# Patient Record
Sex: MALE | Race: WHITE | NOT HISPANIC OR LATINO | URBAN - METROPOLITAN AREA
[De-identification: names, ages, dates, MRNs, and addresses within clinical notes are randomized per-mention and may not be internally consistent; named-entity substitution may affect disease eponyms.]

---

## 2022-11-15 ENCOUNTER — OFFICE (OUTPATIENT)
Dept: URBAN - METROPOLITAN AREA CLINIC 13 | Facility: CLINIC | Age: 82
End: 2022-11-15
Payer: MEDICARE

## 2022-11-15 VITALS
WEIGHT: 196 LBS | DIASTOLIC BLOOD PRESSURE: 80 MMHG | HEIGHT: 67 IN | HEART RATE: 57 BPM | OXYGEN SATURATION: 97 % | SYSTOLIC BLOOD PRESSURE: 120 MMHG

## 2022-11-15 DIAGNOSIS — K57.30 DIVERTICULOSIS OF LARGE INTESTINE WITHOUT PERFORATION OR ABS: ICD-10-CM

## 2022-11-15 DIAGNOSIS — N18.9 CHRONIC KIDNEY DISEASE, UNSPECIFIED: ICD-10-CM

## 2022-11-15 DIAGNOSIS — R93.2 ABNORMAL FINDINGS ON DIAGNOSTIC IMAGING OF LIVER AND BILIARY: ICD-10-CM

## 2022-11-15 DIAGNOSIS — I48.91 UNSPECIFIED ATRIAL FIBRILLATION: ICD-10-CM

## 2022-11-15 DIAGNOSIS — E11.9 TYPE 2 DIABETES MELLITUS WITHOUT COMPLICATIONS: ICD-10-CM

## 2022-11-15 DIAGNOSIS — K74.60 UNSPECIFIED CIRRHOSIS OF LIVER: ICD-10-CM

## 2022-11-15 DIAGNOSIS — D64.9 ANEMIA, UNSPECIFIED: ICD-10-CM

## 2022-11-15 DIAGNOSIS — E66.3 OVERWEIGHT: ICD-10-CM

## 2022-11-15 PROCEDURE — 99214 OFFICE O/P EST MOD 30 MIN: CPT | Performed by: INTERNAL MEDICINE

## 2023-02-15 ENCOUNTER — OFFICE (OUTPATIENT)
Dept: URBAN - METROPOLITAN AREA CLINIC 13 | Facility: CLINIC | Age: 83
End: 2023-02-15

## 2023-02-15 VITALS
OXYGEN SATURATION: 98 % | HEIGHT: 67 IN | HEART RATE: 54 BPM | SYSTOLIC BLOOD PRESSURE: 142 MMHG | WEIGHT: 195 LBS | DIASTOLIC BLOOD PRESSURE: 70 MMHG

## 2023-02-15 DIAGNOSIS — I48.91 UNSPECIFIED ATRIAL FIBRILLATION: ICD-10-CM

## 2023-02-15 DIAGNOSIS — K57.30 DIVERTICULOSIS OF LARGE INTESTINE WITHOUT PERFORATION OR ABS: ICD-10-CM

## 2023-02-15 DIAGNOSIS — N18.9 CHRONIC KIDNEY DISEASE, UNSPECIFIED: ICD-10-CM

## 2023-02-15 DIAGNOSIS — D50.9 IRON DEFICIENCY ANEMIA, UNSPECIFIED: ICD-10-CM

## 2023-02-15 DIAGNOSIS — K74.60 UNSPECIFIED CIRRHOSIS OF LIVER: ICD-10-CM

## 2023-02-15 DIAGNOSIS — E11.9 TYPE 2 DIABETES MELLITUS WITHOUT COMPLICATIONS: ICD-10-CM

## 2023-02-15 DIAGNOSIS — Z86.010 PERSONAL HISTORY OF COLONIC POLYPS: ICD-10-CM

## 2023-02-15 DIAGNOSIS — D64.9 ANEMIA, UNSPECIFIED: ICD-10-CM

## 2023-02-15 DIAGNOSIS — E66.3 OVERWEIGHT: ICD-10-CM

## 2023-02-15 PROCEDURE — 99215 OFFICE O/P EST HI 40 MIN: CPT | Performed by: INTERNAL MEDICINE

## 2023-04-10 LAB
AFP - TUMOR MARKER: 1.7 NG/ML
CBC, PLATELET CT  AND  DIFF: ABS BASOPHIL: 0 K/UL
CBC, PLATELET CT  AND  DIFF: ABS EOSINOPHIL: 0.3 K/UL
CBC, PLATELET CT  AND  DIFF: ABS IMMATURE GRANS: 0 K/UL
CBC, PLATELET CT  AND  DIFF: ABS LYMPHOCYTE: 1.1 K/UL
CBC, PLATELET CT  AND  DIFF: ABS MONOCYTE: 0.6 K/UL
CBC, PLATELET CT  AND  DIFF: ABS NEUTROPHIL: 3.4 K/UL
CBC, PLATELET CT  AND  DIFF: BASOPHIL: 0.6 %
CBC, PLATELET CT  AND  DIFF: DIFFERENTIAL: (no result)
CBC, PLATELET CT  AND  DIFF: EOSINOPHIL: 5.3 % — HIGH
CBC, PLATELET CT  AND  DIFF: HEMATOCRIT: 35.6 % — LOW
CBC, PLATELET CT  AND  DIFF: HEMOGLOBIN: 12.6 G/DL — LOW
CBC, PLATELET CT  AND  DIFF: IMMATURE GRANULOCYTES: 0.4 %
CBC, PLATELET CT  AND  DIFF: LYMPHOCYTE: 20.4 %
CBC, PLATELET CT  AND  DIFF: MCH: 31.7 PG
CBC, PLATELET CT  AND  DIFF: MCHC: 35.4 G/DL
CBC, PLATELET CT  AND  DIFF: MCV: 89.7 FL
CBC, PLATELET CT  AND  DIFF: MONOCYTE: 10.5 %
CBC, PLATELET CT  AND  DIFF: MPV: 12.3 FL — HIGH
CBC, PLATELET CT  AND  DIFF: NEUTROPHIL: 62.8 %
CBC, PLATELET CT  AND  DIFF: NRBCS: 0 /100 WBC
CBC, PLATELET CT  AND  DIFF: PLATELET COUNT: 141 K/UL
CBC, PLATELET CT  AND  DIFF: RBC: 3.97 M/UL — LOW
CBC, PLATELET CT  AND  DIFF: RDW: 16.1 % — HIGH
CBC, PLATELET CT  AND  DIFF: WBC COUNT: 5.5 K/UL
HEPATIC FUNCTION PANEL: A/G RATIO: 1.5 RATIO
HEPATIC FUNCTION PANEL: ALBUMIN: 4.4 G/DL
HEPATIC FUNCTION PANEL: ALK PHOSPHATASE: 127 U/L
HEPATIC FUNCTION PANEL: ALT: 43 U/L
HEPATIC FUNCTION PANEL: AST: 25 U/L
HEPATIC FUNCTION PANEL: BILIRUBIN, INDIRECT: 0.6 MG/DL
HEPATIC FUNCTION PANEL: BILIRUBIN,DIRECT: 0.2 MG/DL
HEPATIC FUNCTION PANEL: BILIRUBIN,TOTAL: 0.8 MG/DL
HEPATIC FUNCTION PANEL: GLOBULIN: 3 G/DL
HEPATIC FUNCTION PANEL: TOTAL PROTEIN: 7.4 G/DL

## 2023-04-19 ENCOUNTER — ON CAMPUS - OUTPATIENT (OUTPATIENT)
Dept: URBAN - METROPOLITAN AREA HOSPITAL 139 | Facility: HOSPITAL | Age: 83
End: 2023-04-19

## 2023-04-19 DIAGNOSIS — D50.9 IRON DEFICIENCY ANEMIA, UNSPECIFIED: ICD-10-CM

## 2023-04-19 DIAGNOSIS — K57.30 DIVERTICULOSIS OF LARGE INTESTINE WITHOUT PERFORATION OR ABS: ICD-10-CM

## 2023-04-19 DIAGNOSIS — K64.8 OTHER HEMORRHOIDS: ICD-10-CM

## 2023-04-19 DIAGNOSIS — K31.89 OTHER DISEASES OF STOMACH AND DUODENUM: ICD-10-CM

## 2023-04-19 DIAGNOSIS — D12.0 BENIGN NEOPLASM OF CECUM: ICD-10-CM

## 2023-04-19 PROCEDURE — 43239 EGD BIOPSY SINGLE/MULTIPLE: CPT | Performed by: INTERNAL MEDICINE

## 2023-04-19 PROCEDURE — 45380 COLONOSCOPY AND BIOPSY: CPT | Performed by: INTERNAL MEDICINE

## 2023-07-13 ENCOUNTER — OFFICE (OUTPATIENT)
Dept: URBAN - METROPOLITAN AREA CLINIC 13 | Facility: CLINIC | Age: 83
End: 2023-07-13

## 2023-07-13 VITALS
SYSTOLIC BLOOD PRESSURE: 160 MMHG | OXYGEN SATURATION: 97 % | HEART RATE: 64 BPM | WEIGHT: 206 LBS | HEIGHT: 67 IN | DIASTOLIC BLOOD PRESSURE: 78 MMHG

## 2023-07-13 DIAGNOSIS — N18.9 CHRONIC KIDNEY DISEASE, UNSPECIFIED: ICD-10-CM

## 2023-07-13 DIAGNOSIS — E11.9 TYPE 2 DIABETES MELLITUS WITHOUT COMPLICATIONS: ICD-10-CM

## 2023-07-13 DIAGNOSIS — I48.91 UNSPECIFIED ATRIAL FIBRILLATION: ICD-10-CM

## 2023-07-13 DIAGNOSIS — K74.60 UNSPECIFIED CIRRHOSIS OF LIVER: ICD-10-CM

## 2023-07-13 DIAGNOSIS — D12.0 BENIGN NEOPLASM OF CECUM: ICD-10-CM

## 2023-07-13 DIAGNOSIS — E66.9 OBESITY, UNSPECIFIED: ICD-10-CM

## 2023-07-13 DIAGNOSIS — D64.9 ANEMIA, UNSPECIFIED: ICD-10-CM

## 2023-07-13 DIAGNOSIS — K76.0 FATTY (CHANGE OF) LIVER, NOT ELSEWHERE CLASSIFIED: ICD-10-CM

## 2023-07-13 DIAGNOSIS — K57.30 DIVERTICULOSIS OF LARGE INTESTINE WITHOUT PERFORATION OR ABS: ICD-10-CM

## 2023-07-13 PROCEDURE — 99214 OFFICE O/P EST MOD 30 MIN: CPT | Performed by: INTERNAL MEDICINE

## 2023-07-14 LAB
AFP - TUMOR MARKER: 1.9 NG/ML
CBC, PLATELET CT  AND  DIFF: ABS BASOPHIL: 0.1 K/UL
CBC, PLATELET CT  AND  DIFF: ABS EOSINOPHIL: 0.3 K/UL
CBC, PLATELET CT  AND  DIFF: ABS IMMATURE GRANS: 0 K/UL
CBC, PLATELET CT  AND  DIFF: ABS LYMPHOCYTE: 1.6 K/UL
CBC, PLATELET CT  AND  DIFF: ABS MONOCYTE: 0.8 K/UL
CBC, PLATELET CT  AND  DIFF: ABS NEUTROPHIL: 3.9 K/UL
CBC, PLATELET CT  AND  DIFF: BASOPHIL: 0.7 %
CBC, PLATELET CT  AND  DIFF: DIFFERENTIAL: (no result)
CBC, PLATELET CT  AND  DIFF: EOSINOPHIL: 4.8 %
CBC, PLATELET CT  AND  DIFF: HEMATOCRIT: 32.6 % — LOW
CBC, PLATELET CT  AND  DIFF: HEMOGLOBIN: 11.3 G/DL — LOW
CBC, PLATELET CT  AND  DIFF: IMMATURE GRANULOCYTES: 0.3 %
CBC, PLATELET CT  AND  DIFF: LYMPHOCYTE: 23.7 %
CBC, PLATELET CT  AND  DIFF: MCH: 33.4 PG
CBC, PLATELET CT  AND  DIFF: MCHC: 34.7 G/DL
CBC, PLATELET CT  AND  DIFF: MCV: 96.4 FL
CBC, PLATELET CT  AND  DIFF: MONOCYTE: 12.5 % — HIGH
CBC, PLATELET CT  AND  DIFF: MPV: 12.1 FL — HIGH
CBC, PLATELET CT  AND  DIFF: NEUTROPHIL: 58 %
CBC, PLATELET CT  AND  DIFF: NRBCS: 0 /100 WBC
CBC, PLATELET CT  AND  DIFF: PLATELET COUNT: 143 K/UL
CBC, PLATELET CT  AND  DIFF: RBC: 3.38 M/UL — LOW
CBC, PLATELET CT  AND  DIFF: RDW: 14.1 %
CBC, PLATELET CT  AND  DIFF: WBC COUNT: 6.7 K/UL
HEPATIC FUNCTION PANEL: A/G RATIO: 1.7 RATIO
HEPATIC FUNCTION PANEL: ALBUMIN: 4.3 G/DL
HEPATIC FUNCTION PANEL: ALK PHOSPHATASE: 95 U/L
HEPATIC FUNCTION PANEL: ALT: 31 U/L
HEPATIC FUNCTION PANEL: AST: 29 U/L
HEPATIC FUNCTION PANEL: BILIRUBIN, INDIRECT: 0.9 MG/DL
HEPATIC FUNCTION PANEL: BILIRUBIN,DIRECT: 0.3 MG/DL
HEPATIC FUNCTION PANEL: BILIRUBIN,TOTAL: 1.2 MG/DL
HEPATIC FUNCTION PANEL: GLOBULIN: 2.6 G/DL
HEPATIC FUNCTION PANEL: TOTAL PROTEIN: 6.9 G/DL

## 2024-01-19 ENCOUNTER — OFFICE (OUTPATIENT)
Dept: URBAN - METROPOLITAN AREA CLINIC 13 | Facility: CLINIC | Age: 84
End: 2024-01-19

## 2024-01-19 VITALS
WEIGHT: 202 LBS | SYSTOLIC BLOOD PRESSURE: 134 MMHG | HEART RATE: 77 BPM | DIASTOLIC BLOOD PRESSURE: 70 MMHG | HEIGHT: 67 IN

## 2024-01-19 DIAGNOSIS — K57.30 DIVERTICULOSIS OF LARGE INTESTINE WITHOUT PERFORATION OR ABS: ICD-10-CM

## 2024-01-19 DIAGNOSIS — N18.9 CHRONIC KIDNEY DISEASE, UNSPECIFIED: ICD-10-CM

## 2024-01-19 DIAGNOSIS — K74.60 UNSPECIFIED CIRRHOSIS OF LIVER: ICD-10-CM

## 2024-01-19 DIAGNOSIS — K21.9 GASTRO-ESOPHAGEAL REFLUX DISEASE WITHOUT ESOPHAGITIS: ICD-10-CM

## 2024-01-19 DIAGNOSIS — K76.0 FATTY (CHANGE OF) LIVER, NOT ELSEWHERE CLASSIFIED: ICD-10-CM

## 2024-01-19 DIAGNOSIS — Z86.010 PERSONAL HISTORY OF COLONIC POLYPS: ICD-10-CM

## 2024-01-19 PROCEDURE — 99214 OFFICE O/P EST MOD 30 MIN: CPT | Performed by: INTERNAL MEDICINE

## 2024-07-03 LAB
ALPHA-FETOPROTEIN (AFP), TUMOR MARKER: <1.8 NG/ML
CBC, PLATELET CT  AND  DIFF: ABS BASOPHIL: 0.1 K/UL
CBC, PLATELET CT  AND  DIFF: ABS EOSINOPHIL: 0.2 K/UL
CBC, PLATELET CT  AND  DIFF: ABS IMMATURE GRANS: 0 K/UL
CBC, PLATELET CT  AND  DIFF: ABS LYMPHOCYTE: 1.1 K/UL
CBC, PLATELET CT  AND  DIFF: ABS MONOCYTE: 0.5 K/UL
CBC, PLATELET CT  AND  DIFF: ABS NEUTROPHIL: 2.8 K/UL
CBC, PLATELET CT  AND  DIFF: BASOPHIL: 1.1 %
CBC, PLATELET CT  AND  DIFF: DIFFERENTIAL: (no result)
CBC, PLATELET CT  AND  DIFF: EOSINOPHIL: 4.9 %
CBC, PLATELET CT  AND  DIFF: HEMATOCRIT: 33.9 % — LOW
CBC, PLATELET CT  AND  DIFF: HEMOGLOBIN: 11.6 G/DL — LOW
CBC, PLATELET CT  AND  DIFF: IMMATURE GRANULOCYTES: 0.2 %
CBC, PLATELET CT  AND  DIFF: LYMPHOCYTE: 23.3 %
CBC, PLATELET CT  AND  DIFF: MCH: 32.3 PG
CBC, PLATELET CT  AND  DIFF: MCHC: 34.2 G/DL
CBC, PLATELET CT  AND  DIFF: MCV: 94.4 FL
CBC, PLATELET CT  AND  DIFF: MONOCYTE: 10.3 %
CBC, PLATELET CT  AND  DIFF: MPV: 12.9 FL — HIGH
CBC, PLATELET CT  AND  DIFF: NEUTROPHIL: 60.2 %
CBC, PLATELET CT  AND  DIFF: NRBCS: 0 /100 WBC
CBC, PLATELET CT  AND  DIFF: PLATELET COUNT: 123 K/UL — LOW
CBC, PLATELET CT  AND  DIFF: RBC: 3.59 M/UL — LOW
CBC, PLATELET CT  AND  DIFF: RDW: 13.7 %
CBC, PLATELET CT  AND  DIFF: WBC COUNT: 4.7 K/UL
HEPATIC FUNCTION PANEL: A/G RATIO: 1.4 RATIO
HEPATIC FUNCTION PANEL: ALBUMIN: 4.1 G/DL
HEPATIC FUNCTION PANEL: ALK PHOSPHATASE: 98 U/L
HEPATIC FUNCTION PANEL: ALT: 28 U/L
HEPATIC FUNCTION PANEL: AST: 25 U/L
HEPATIC FUNCTION PANEL: BILIRUBIN, INDIRECT: 0.9 MG/DL
HEPATIC FUNCTION PANEL: BILIRUBIN,DIRECT: 0.2 MG/DL
HEPATIC FUNCTION PANEL: BILIRUBIN,TOTAL: 1.1 MG/DL
HEPATIC FUNCTION PANEL: GLOBULIN: 2.9 G/DL
HEPATIC FUNCTION PANEL: TOTAL PROTEIN: 7 G/DL

## 2024-07-15 ENCOUNTER — OFFICE (OUTPATIENT)
Dept: URBAN - METROPOLITAN AREA CLINIC 13 | Facility: CLINIC | Age: 84
End: 2024-07-15

## 2024-07-15 VITALS
SYSTOLIC BLOOD PRESSURE: 130 MMHG | DIASTOLIC BLOOD PRESSURE: 80 MMHG | HEART RATE: 71 BPM | WEIGHT: 202 LBS | HEIGHT: 67 IN

## 2024-07-15 DIAGNOSIS — K57.30 DIVERTICULOSIS OF LARGE INTESTINE WITHOUT PERFORATION OR ABS: ICD-10-CM

## 2024-07-15 DIAGNOSIS — N18.9 CHRONIC KIDNEY DISEASE, UNSPECIFIED: ICD-10-CM

## 2024-07-15 DIAGNOSIS — K74.60 UNSPECIFIED CIRRHOSIS OF LIVER: ICD-10-CM

## 2024-07-15 DIAGNOSIS — K76.0 FATTY (CHANGE OF) LIVER, NOT ELSEWHERE CLASSIFIED: ICD-10-CM

## 2024-07-15 DIAGNOSIS — D64.9 ANEMIA, UNSPECIFIED: ICD-10-CM

## 2024-07-15 DIAGNOSIS — E11.9 TYPE 2 DIABETES MELLITUS WITHOUT COMPLICATIONS: ICD-10-CM

## 2024-07-15 PROCEDURE — 99214 OFFICE O/P EST MOD 30 MIN: CPT | Performed by: INTERNAL MEDICINE

## 2024-12-23 LAB
ALPHA-FETOPROTEIN (AFP), TUMOR MARKER: <1.8 NG/ML
CBC, PLATELET CT  AND  DIFF: ABS BASOPHIL: 0 K/UL
CBC, PLATELET CT  AND  DIFF: ABS EOSINOPHIL: 0.2 K/UL
CBC, PLATELET CT  AND  DIFF: ABS IMMATURE GRANS: 0.1 K/UL
CBC, PLATELET CT  AND  DIFF: ABS LYMPHOCYTE: 1.3 K/UL
CBC, PLATELET CT  AND  DIFF: ABS MONOCYTE: 0.5 K/UL
CBC, PLATELET CT  AND  DIFF: ABS NEUTROPHIL: 3.9 K/UL
CBC, PLATELET CT  AND  DIFF: BASOPHIL: 0.3 %
CBC, PLATELET CT  AND  DIFF: DIFFERENTIAL: (no result)
CBC, PLATELET CT  AND  DIFF: EOSINOPHIL: 3.2 %
CBC, PLATELET CT  AND  DIFF: HEMATOCRIT: 37.1 % — LOW
CBC, PLATELET CT  AND  DIFF: HEMOGLOBIN: 12.1 G/DL — LOW
CBC, PLATELET CT  AND  DIFF: IMMATURE GRANULOCYTES: 1 % — HIGH
CBC, PLATELET CT  AND  DIFF: LYMPHOCYTE: 21.7 %
CBC, PLATELET CT  AND  DIFF: MCH: 31.4 PG
CBC, PLATELET CT  AND  DIFF: MCHC: 32.6 G/DL
CBC, PLATELET CT  AND  DIFF: MCV: 96.4 FL
CBC, PLATELET CT  AND  DIFF: MONOCYTE: 7.9 %
CBC, PLATELET CT  AND  DIFF: MPV: 12.3 FL — HIGH
CBC, PLATELET CT  AND  DIFF: NEUTROPHIL: 65.9 %
CBC, PLATELET CT  AND  DIFF: NRBCS: 0 /100 WBC
CBC, PLATELET CT  AND  DIFF: PLATELET COUNT: 162 K/UL
CBC, PLATELET CT  AND  DIFF: RBC: 3.85 M/UL — LOW
CBC, PLATELET CT  AND  DIFF: RDW: 14.2 %
CBC, PLATELET CT  AND  DIFF: WBC COUNT: 6 K/UL
HEPATIC FUNCTION PANEL: A/G RATIO: 1.4 RATIO
HEPATIC FUNCTION PANEL: ALBUMIN: 3.9 G/DL
HEPATIC FUNCTION PANEL: ALK PHOSPHATASE: 125 U/L
HEPATIC FUNCTION PANEL: ALT: 54 U/L
HEPATIC FUNCTION PANEL: AST: 38 U/L
HEPATIC FUNCTION PANEL: BILIRUBIN, INDIRECT: 1 MG/DL
HEPATIC FUNCTION PANEL: BILIRUBIN,DIRECT: 0.3 MG/DL
HEPATIC FUNCTION PANEL: BILIRUBIN,TOTAL: 1.3 MG/DL — HIGH
HEPATIC FUNCTION PANEL: GLOBULIN: 2.7 G/DL
HEPATIC FUNCTION PANEL: TOTAL PROTEIN: 6.6 G/DL

## 2025-01-15 ENCOUNTER — OFFICE (OUTPATIENT)
Dept: URBAN - METROPOLITAN AREA CLINIC 13 | Facility: CLINIC | Age: 85
End: 2025-01-15

## 2025-01-15 VITALS
HEART RATE: 59 BPM | SYSTOLIC BLOOD PRESSURE: 134 MMHG | OXYGEN SATURATION: 98 % | DIASTOLIC BLOOD PRESSURE: 84 MMHG | WEIGHT: 190 LBS | HEIGHT: 67 IN

## 2025-01-15 DIAGNOSIS — K76.0 FATTY (CHANGE OF) LIVER, NOT ELSEWHERE CLASSIFIED: ICD-10-CM

## 2025-01-15 DIAGNOSIS — K57.30 DIVERTICULOSIS OF LARGE INTESTINE WITHOUT PERFORATION OR ABS: ICD-10-CM

## 2025-01-15 DIAGNOSIS — E66.9 OBESITY, UNSPECIFIED: ICD-10-CM

## 2025-01-15 DIAGNOSIS — K74.60 UNSPECIFIED CIRRHOSIS OF LIVER: ICD-10-CM

## 2025-01-15 DIAGNOSIS — E11.9 TYPE 2 DIABETES MELLITUS WITHOUT COMPLICATIONS: ICD-10-CM

## 2025-01-15 PROCEDURE — 99214 OFFICE O/P EST MOD 30 MIN: CPT | Performed by: INTERNAL MEDICINE

## 2025-06-09 LAB
ALPHA-FETOPROTEIN (AFP), TUMOR MARKER: <1.8 NG/ML
CBC, PLATELET CT  AND  DIFF: ABS BASOPHIL: 0.1 K/UL
CBC, PLATELET CT  AND  DIFF: ABS EOSINOPHIL: 0.2 K/UL
CBC, PLATELET CT  AND  DIFF: ABS IMMATURE GRANS: 0 K/UL
CBC, PLATELET CT  AND  DIFF: ABS LYMPHOCYTE: 1 K/UL
CBC, PLATELET CT  AND  DIFF: ABS MONOCYTE: 0.6 K/UL
CBC, PLATELET CT  AND  DIFF: ABS NEUTROPHIL: 3.7 K/UL
CBC, PLATELET CT  AND  DIFF: BASOPHIL: 0.9 %
CBC, PLATELET CT  AND  DIFF: DIFFERENTIAL: (no result)
CBC, PLATELET CT  AND  DIFF: EOSINOPHIL: 3.9 %
CBC, PLATELET CT  AND  DIFF: HEMATOCRIT: 42 %
CBC, PLATELET CT  AND  DIFF: HEMOGLOBIN: 13.4 G/DL
CBC, PLATELET CT  AND  DIFF: IMMATURE GRANULOCYTES: 0.4 %
CBC, PLATELET CT  AND  DIFF: LYMPHOCYTE: 18.1 %
CBC, PLATELET CT  AND  DIFF: MCH: 31.6 PG
CBC, PLATELET CT  AND  DIFF: MCHC: 31.9 G/DL
CBC, PLATELET CT  AND  DIFF: MCV: 99.1 FL
CBC, PLATELET CT  AND  DIFF: MONOCYTE: 11.2 %
CBC, PLATELET CT  AND  DIFF: MPV: 12.3 FL — HIGH
CBC, PLATELET CT  AND  DIFF: NEUTROPHIL: 65.5 %
CBC, PLATELET CT  AND  DIFF: NRBCS: 0 /100 WBC
CBC, PLATELET CT  AND  DIFF: PLATELET COUNT: 143 K/UL
CBC, PLATELET CT  AND  DIFF: RBC: 4.24 M/UL
CBC, PLATELET CT  AND  DIFF: RDW: 14.7 %
CBC, PLATELET CT  AND  DIFF: WBC COUNT: 5.7 K/UL
HEPATIC FUNCTION PANEL: A/G RATIO: 1.5 RATIO
HEPATIC FUNCTION PANEL: ALBUMIN: 4.1 G/DL
HEPATIC FUNCTION PANEL: ALK PHOSPHATASE: 109 U/L
HEPATIC FUNCTION PANEL: ALT: 27 U/L
HEPATIC FUNCTION PANEL: AST: 27 U/L
HEPATIC FUNCTION PANEL: BILIRUBIN, INDIRECT: 0.7 MG/DL
HEPATIC FUNCTION PANEL: BILIRUBIN,DIRECT: 0.3 MG/DL
HEPATIC FUNCTION PANEL: BILIRUBIN,TOTAL: 1 MG/DL
HEPATIC FUNCTION PANEL: GLOBULIN: 2.7 G/DL
HEPATIC FUNCTION PANEL: TOTAL PROTEIN: 6.8 G/DL
PROTIME: INR: 1.1
PROTIME: PT: 14.4 SEC — HIGH

## 2025-07-17 ENCOUNTER — OFFICE (OUTPATIENT)
Dept: URBAN - METROPOLITAN AREA CLINIC 13 | Facility: CLINIC | Age: 85
End: 2025-07-17
Payer: MEDICARE

## 2025-07-17 VITALS
WEIGHT: 195 LBS | OXYGEN SATURATION: 97 % | DIASTOLIC BLOOD PRESSURE: 78 MMHG | HEART RATE: 68 BPM | HEIGHT: 67 IN | SYSTOLIC BLOOD PRESSURE: 144 MMHG

## 2025-07-17 DIAGNOSIS — K74.60 UNSPECIFIED CIRRHOSIS OF LIVER: ICD-10-CM

## 2025-07-17 PROCEDURE — 99214 OFFICE O/P EST MOD 30 MIN: CPT

## 2025-07-17 NOTE — SERVICEHPINOTES
Everett Voss patient, with fatty liver cirrhosis, presents for follow-up.He has a history of fatty liver cirrhosis and underwent blood work in January. An abdominal ultrasound was performed in February, and the patient was told it showed cirrhosis, a patent portal vein, and no masses.He has a history of heart failure and attends a heart failure clinic. His medication dosages have been adjusted, and he monitors his blood pressure, weight, and blood sugar daily. His blood sugar today was around 150 mg/dL, and his weight has been consistent, ranging from 189 to 195 pounds.No abdominal pain, nausea, vomiting, diarrhea, constipation, or acid reflux. He experiences occasional burping and frequent lightheadedness, especially when getting up and moving, which he manages by resting.     
br
br   2/84/25-US abdomen-Cirrhotic liver without discrete mass
br6/9/25-PT-14.4, AFP-&lt1.8, ALT-27, AST-27, Alk phos-109, total bilirubin-1.0br